# Patient Record
Sex: FEMALE | Race: WHITE | NOT HISPANIC OR LATINO | Employment: OTHER | URBAN - METROPOLITAN AREA
[De-identification: names, ages, dates, MRNs, and addresses within clinical notes are randomized per-mention and may not be internally consistent; named-entity substitution may affect disease eponyms.]

---

## 2021-02-18 ENCOUNTER — TELEPHONE (OUTPATIENT)
Dept: FAMILY MEDICINE | Facility: CLINIC | Age: 81
End: 2021-02-18

## 2021-02-18 RX ORDER — IRON,CARB/VIT C/VIT B12/FOLIC 100-250-1
1 TABLET ORAL DAILY
COMMUNITY
Start: 2020-11-18 | End: 2021-05-05

## 2021-02-22 RX ORDER — GLIPIZIDE 2.5 MG/1
2.5 TABLET, EXTENDED RELEASE ORAL DAILY
COMMUNITY
Start: 2021-02-20 | End: 2021-02-22 | Stop reason: SDUPTHER

## 2021-02-22 RX ORDER — GLIPIZIDE 2.5 MG/1
2.5 TABLET, EXTENDED RELEASE ORAL DAILY
Qty: 30 TABLET | Refills: 5 | Status: SHIPPED | OUTPATIENT
Start: 2021-02-22 | End: 2023-01-12 | Stop reason: SDUPTHER

## 2021-03-01 ENCOUNTER — OFFICE VISIT (OUTPATIENT)
Dept: FAMILY MEDICINE | Facility: CLINIC | Age: 81
End: 2021-03-01
Payer: MEDICARE

## 2021-03-01 VITALS
HEIGHT: 62 IN | TEMPERATURE: 97 F | BODY MASS INDEX: 21.53 KG/M2 | HEART RATE: 55 BPM | DIASTOLIC BLOOD PRESSURE: 82 MMHG | OXYGEN SATURATION: 98 % | SYSTOLIC BLOOD PRESSURE: 110 MMHG | WEIGHT: 117 LBS

## 2021-03-01 DIAGNOSIS — Z12.31 ENCOUNTER FOR SCREENING MAMMOGRAM FOR MALIGNANT NEOPLASM OF BREAST: ICD-10-CM

## 2021-03-01 DIAGNOSIS — M54.2 CERVICALGIA: Primary | ICD-10-CM

## 2021-03-01 DIAGNOSIS — Z12.39 ENCOUNTER FOR SCREENING FOR MALIGNANT NEOPLASM OF BREAST, UNSPECIFIED SCREENING MODALITY: ICD-10-CM

## 2021-03-01 DIAGNOSIS — K21.9 GASTROESOPHAGEAL REFLUX DISEASE, UNSPECIFIED WHETHER ESOPHAGITIS PRESENT: ICD-10-CM

## 2021-03-01 DIAGNOSIS — R73.09 ELEVATED GLUCOSE: ICD-10-CM

## 2021-03-01 PROCEDURE — 99214 OFFICE O/P EST MOD 30 MIN: CPT | Mod: S$GLB,,, | Performed by: NURSE PRACTITIONER

## 2021-03-01 PROCEDURE — 99214 PR OFFICE/OUTPT VISIT, EST, LEVL IV, 30-39 MIN: ICD-10-PCS | Mod: S$GLB,,, | Performed by: NURSE PRACTITIONER

## 2021-03-01 RX ORDER — FAMOTIDINE 40 MG/1
40 TABLET, FILM COATED ORAL DAILY
COMMUNITY
End: 2021-08-03 | Stop reason: SDUPTHER

## 2021-03-01 RX ORDER — OMEPRAZOLE 20 MG/1
20 CAPSULE, DELAYED RELEASE ORAL DAILY
Qty: 30 CAPSULE | Refills: 11 | Status: SHIPPED | OUTPATIENT
Start: 2021-03-01 | End: 2021-05-05

## 2021-05-03 ENCOUNTER — OFFICE VISIT (OUTPATIENT)
Dept: FAMILY MEDICINE | Facility: CLINIC | Age: 81
End: 2021-05-03
Payer: MEDICARE

## 2021-05-03 VITALS
BODY MASS INDEX: 23.19 KG/M2 | HEIGHT: 62 IN | DIASTOLIC BLOOD PRESSURE: 76 MMHG | OXYGEN SATURATION: 97 % | TEMPERATURE: 97 F | HEART RATE: 60 BPM | SYSTOLIC BLOOD PRESSURE: 142 MMHG | WEIGHT: 126 LBS

## 2021-05-03 DIAGNOSIS — E11.9 DIABETES MELLITUS WITHOUT COMPLICATION: Primary | ICD-10-CM

## 2021-05-03 DIAGNOSIS — H92.03 EAR PAIN, BILATERAL: ICD-10-CM

## 2021-05-03 DIAGNOSIS — I25.10 CORONARY ARTERY DISEASE, ANGINA PRESENCE UNSPECIFIED, UNSPECIFIED VESSEL OR LESION TYPE, UNSPECIFIED WHETHER NATIVE OR TRANSPLANTED HEART: ICD-10-CM

## 2021-05-03 PROCEDURE — 99214 PR OFFICE/OUTPT VISIT, EST, LEVL IV, 30-39 MIN: ICD-10-PCS | Mod: S$GLB,,, | Performed by: NURSE PRACTITIONER

## 2021-05-03 PROCEDURE — 99214 OFFICE O/P EST MOD 30 MIN: CPT | Mod: S$GLB,,, | Performed by: NURSE PRACTITIONER

## 2021-05-03 RX ORDER — LATANOPROST 50 UG/ML
SOLUTION/ DROPS OPHTHALMIC
COMMUNITY
Start: 2021-04-05

## 2021-05-03 RX ORDER — PRAVASTATIN SODIUM 40 MG/1
TABLET ORAL
COMMUNITY
Start: 2021-03-02 | End: 2021-05-05

## 2021-05-05 RX ORDER — LANOLIN ALCOHOL/MO/W.PET/CERES
100 CREAM (GRAM) TOPICAL DAILY
COMMUNITY

## 2021-05-05 RX ORDER — FERROUS SULFATE 324(65)MG
324 TABLET, DELAYED RELEASE (ENTERIC COATED) ORAL
COMMUNITY

## 2021-05-05 RX ORDER — ASPIRIN 81 MG/1
81 TABLET ORAL DAILY
COMMUNITY

## 2021-05-05 RX ORDER — MINOXIDIL 2 %
1 SOLUTION, NON-ORAL TOPICAL 2 TIMES DAILY
COMMUNITY

## 2021-05-05 RX ORDER — PANTOPRAZOLE SODIUM 40 MG/1
40 TABLET, DELAYED RELEASE ORAL DAILY
COMMUNITY
End: 2021-08-03

## 2021-05-05 RX ORDER — UBIQUINOL 100 MG
1 CAPSULE ORAL DAILY
COMMUNITY

## 2021-05-05 RX ORDER — PRAVASTATIN SODIUM 20 MG/1
20 TABLET ORAL DAILY
COMMUNITY
End: 2023-09-12

## 2021-05-05 RX ORDER — CHOLECALCIFEROL (VITAMIN D3) 125 MCG
1 CAPSULE ORAL DAILY
COMMUNITY

## 2021-05-05 RX ORDER — MULTIVITAMIN
1 TABLET ORAL ONCE
COMMUNITY

## 2021-08-03 ENCOUNTER — OFFICE VISIT (OUTPATIENT)
Dept: FAMILY MEDICINE | Facility: CLINIC | Age: 81
End: 2021-08-03
Payer: MEDICARE

## 2021-08-03 VITALS
DIASTOLIC BLOOD PRESSURE: 68 MMHG | SYSTOLIC BLOOD PRESSURE: 140 MMHG | WEIGHT: 115 LBS | HEIGHT: 62 IN | BODY MASS INDEX: 21.16 KG/M2 | RESPIRATION RATE: 18 BRPM | HEART RATE: 81 BPM | TEMPERATURE: 98 F | OXYGEN SATURATION: 98 %

## 2021-08-03 DIAGNOSIS — E11.9 DIABETES MELLITUS WITHOUT COMPLICATION: Primary | ICD-10-CM

## 2021-08-03 DIAGNOSIS — K21.9 GASTROESOPHAGEAL REFLUX DISEASE, UNSPECIFIED WHETHER ESOPHAGITIS PRESENT: ICD-10-CM

## 2021-08-03 DIAGNOSIS — G56.02 CARPAL TUNNEL SYNDROME OF LEFT WRIST: ICD-10-CM

## 2021-08-03 PROCEDURE — 99213 PR OFFICE/OUTPT VISIT, EST, LEVL III, 20-29 MIN: ICD-10-PCS | Mod: S$GLB,,, | Performed by: NURSE PRACTITIONER

## 2021-08-03 PROCEDURE — 99213 OFFICE O/P EST LOW 20 MIN: CPT | Mod: S$GLB,,, | Performed by: NURSE PRACTITIONER

## 2021-08-03 RX ORDER — FAMOTIDINE 40 MG/1
40 TABLET, FILM COATED ORAL DAILY
Qty: 90 TABLET | Refills: 1 | Status: SHIPPED | OUTPATIENT
Start: 2021-08-03 | End: 2023-10-10

## 2021-08-03 RX ORDER — OMEPRAZOLE 40 MG/1
40 CAPSULE, DELAYED RELEASE ORAL DAILY PRN
COMMUNITY
End: 2023-10-10

## 2021-08-03 RX ORDER — NITROGLYCERIN 0.4 MG/1
TABLET SUBLINGUAL
COMMUNITY
Start: 2021-06-02

## 2021-11-08 ENCOUNTER — TELEPHONE (OUTPATIENT)
Dept: FAMILY MEDICINE | Facility: CLINIC | Age: 81
End: 2021-11-08
Payer: MEDICARE

## 2021-11-08 RX ORDER — SCOLOPAMINE TRANSDERMAL SYSTEM 1 MG/1
1 PATCH, EXTENDED RELEASE TRANSDERMAL
Qty: 5 PATCH | Refills: 0 | Status: SHIPPED | OUTPATIENT
Start: 2021-11-08 | End: 2022-08-09

## 2022-01-13 ENCOUNTER — OFFICE VISIT (OUTPATIENT)
Dept: FAMILY MEDICINE | Facility: CLINIC | Age: 82
End: 2022-01-13
Payer: MEDICARE

## 2022-01-13 VITALS
SYSTOLIC BLOOD PRESSURE: 138 MMHG | HEART RATE: 77 BPM | HEIGHT: 62 IN | TEMPERATURE: 99 F | RESPIRATION RATE: 18 BRPM | WEIGHT: 115 LBS | BODY MASS INDEX: 21.16 KG/M2 | DIASTOLIC BLOOD PRESSURE: 68 MMHG | OXYGEN SATURATION: 98 %

## 2022-01-13 DIAGNOSIS — M54.10 RADICULAR PAIN: ICD-10-CM

## 2022-01-13 DIAGNOSIS — R31.9 URINARY TRACT INFECTION WITH HEMATURIA, SITE UNSPECIFIED: ICD-10-CM

## 2022-01-13 DIAGNOSIS — K21.9 GASTROESOPHAGEAL REFLUX DISEASE, UNSPECIFIED WHETHER ESOPHAGITIS PRESENT: ICD-10-CM

## 2022-01-13 DIAGNOSIS — E11.9 DIABETES MELLITUS WITHOUT COMPLICATION: Primary | ICD-10-CM

## 2022-01-13 DIAGNOSIS — M54.2 CERVICALGIA: ICD-10-CM

## 2022-01-13 DIAGNOSIS — N39.0 URINARY TRACT INFECTION WITH HEMATURIA, SITE UNSPECIFIED: ICD-10-CM

## 2022-01-13 LAB
BILIRUB SERPL-MCNC: ABNORMAL MG/DL
BLOOD URINE, POC: ABNORMAL
CLARITY, POC UA: CLEAR
COLOR, POC UA: YELLOW
GLUCOSE UR QL STRIP: NEGATIVE
KETONES UR QL STRIP: NEGATIVE
LEUKOCYTE ESTERASE URINE, POC: ABNORMAL
NITRITE, POC UA: NEGATIVE
PH, POC UA: 5.5
PROTEIN, POC: NEGATIVE
SPECIFIC GRAVITY, POC UA: 1.02
UROBILINOGEN, POC UA: ABNORMAL

## 2022-01-13 PROCEDURE — 99213 PR OFFICE/OUTPT VISIT, EST, LEVL III, 20-29 MIN: ICD-10-PCS | Mod: S$GLB,,, | Performed by: FAMILY MEDICINE

## 2022-01-13 PROCEDURE — 81000 URINALYSIS NONAUTO W/SCOPE: CPT | Performed by: FAMILY MEDICINE

## 2022-01-13 PROCEDURE — 81002 POCT URINE DIPSTICK WITHOUT MICROSCOPE: ICD-10-PCS | Mod: S$GLB,,, | Performed by: FAMILY MEDICINE

## 2022-01-13 PROCEDURE — 99213 OFFICE O/P EST LOW 20 MIN: CPT | Mod: S$GLB,,, | Performed by: FAMILY MEDICINE

## 2022-01-13 PROCEDURE — 81002 URINALYSIS NONAUTO W/O SCOPE: CPT | Mod: S$GLB,,, | Performed by: FAMILY MEDICINE

## 2022-01-13 RX ORDER — PHENAZOPYRIDINE HYDROCHLORIDE 200 MG/1
200 TABLET, FILM COATED ORAL 3 TIMES DAILY PRN
Qty: 10 TABLET | Refills: 1 | Status: SHIPPED | OUTPATIENT
Start: 2022-01-13 | End: 2022-01-23

## 2022-01-13 RX ORDER — CEFADROXIL 500 MG/1
500 CAPSULE ORAL EVERY 12 HOURS
Qty: 10 CAPSULE | Refills: 1 | Status: SHIPPED | OUTPATIENT
Start: 2022-01-13 | End: 2022-08-09

## 2022-01-13 NOTE — PROGRESS NOTES
Subjective:       Patient ID: Kayla Valverde is a 81 y.o. female.    Chief Complaint: Urinary Tract Infection      Review of patient's allergies indicates:  No Known Allergies   HPI  Review of Systems   Constitutional: Negative for chills, fatigue, fever and unexpected weight change.   HENT: Negative for ear pain, rhinorrhea, sinus pressure/congestion, sneezing and sore throat.    Eyes: Negative for photophobia and pain.   Respiratory: Negative for chest tightness, shortness of breath and wheezing.    Cardiovascular: Negative for chest pain.   Gastrointestinal: Negative for abdominal distention, abdominal pain, constipation, diarrhea and nausea.   Endocrine: Negative for polydipsia, polyphagia and polyuria.   Genitourinary: Negative for dysuria, frequency, menstrual problem, pelvic pain and urgency.   Musculoskeletal: Negative for back pain and joint swelling.   Integumentary:  Negative for rash, wound, breast mass and breast discharge.   Allergic/Immunologic: Negative for immunocompromised state.   Neurological: Negative for dizziness, vertigo, weakness and headaches.   Psychiatric/Behavioral: Negative for agitation, dysphoric mood and sleep disturbance.   All other systems reviewed and are negative.  Breast: Negative for mass        Objective:      Vitals:    01/13/22 1536   BP: 138/68   Pulse: 77   Resp: 18   Temp: 98.6 °F (37 °C)     Physical Exam  Vitals and nursing note reviewed.   Constitutional:       Appearance: Normal appearance.   HENT:      Head: Normocephalic.      Right Ear: Tympanic membrane normal.      Left Ear: Tympanic membrane normal.      Nose: Nose normal.      Mouth/Throat:      Mouth: Mucous membranes are moist.   Eyes:      Pupils: Pupils are equal, round, and reactive to light.   Cardiovascular:      Rate and Rhythm: Normal rate and regular rhythm.   Pulmonary:      Effort: Pulmonary effort is normal.   Abdominal:      General: Abdomen is flat. Bowel sounds are normal.      Palpations:  Abdomen is soft.   Musculoskeletal:         General: Normal range of motion.   Skin:     General: Skin is warm and dry.   Neurological:      General: No focal deficit present.      Mental Status: She is alert.   Psychiatric:         Mood and Affect: Mood normal.         Behavior: Behavior normal.         Assessment:       1. Diabetes mellitus without complication    2. Gastroesophageal reflux disease, unspecified whether esophagitis present    3. Cervicalgia    4. Radicular pain    5. Urinary tract infection with hematuria, site unspecified        Plan:       Diabetes mellitus without complication    Gastroesophageal reflux disease, unspecified whether esophagitis present    Cervicalgia    Radicular pain    Urinary tract infection with hematuria, site unspecified  -     POCT URINE DIPSTICK WITHOUT MICROSCOPE  -     URINALYSIS    Other orders  -     cefadroxil (DURICEF) 500 MG Cap; Take 1 capsule (500 mg total) by mouth every 12 (twelve) hours.  Dispense: 10 capsule; Refill: 1  -     phenazopyridine (PYRIDIUM) 200 MG tablet; Take 1 tablet (200 mg total) by mouth 3 (three) times daily as needed for Pain.  Dispense: 10 tablet; Refill: 1           Follow up in about 6 months (around 7/13/2022).

## 2022-01-14 LAB
BACTERIA #/AREA URNS HPF: ABNORMAL /HPF
BILIRUB UR QL STRIP: NEGATIVE
CLARITY UR: CLEAR
COLOR UR: YELLOW
GLUCOSE UR QL STRIP: NEGATIVE
HGB UR QL STRIP: ABNORMAL
KETONES UR QL STRIP: NEGATIVE
LEUKOCYTE ESTERASE UR QL STRIP: ABNORMAL
MICROSCOPIC COMMENT: ABNORMAL
NITRITE UR QL STRIP: NEGATIVE
PH UR STRIP: 6 [PH] (ref 5–8)
PROT UR QL STRIP: NEGATIVE
RBC #/AREA URNS HPF: 20 /HPF (ref 0–4)
SP GR UR STRIP: 1.02 (ref 1–1.03)
URN SPEC COLLECT METH UR: ABNORMAL
UROBILINOGEN UR STRIP-ACNC: NEGATIVE EU/DL
WBC #/AREA URNS HPF: >100 /HPF (ref 0–5)

## 2022-01-20 ENCOUNTER — PATIENT OUTREACH (OUTPATIENT)
Dept: ADMINISTRATIVE | Facility: HOSPITAL | Age: 82
End: 2022-01-20
Payer: MEDICARE

## 2022-01-20 DIAGNOSIS — E11.9 CONTROLLED TYPE 2 DIABETES MELLITUS WITHOUT COMPLICATION, WITHOUT LONG-TERM CURRENT USE OF INSULIN: Primary | ICD-10-CM

## 2022-01-20 NOTE — PROGRESS NOTES
Population Health Outreach.      Linked  labs to 2/3/22 appointment with Dr. Galeano.   .

## 2022-01-24 ENCOUNTER — LAB VISIT (OUTPATIENT)
Dept: LAB | Facility: CLINIC | Age: 82
End: 2022-01-24
Payer: MEDICARE

## 2022-01-24 DIAGNOSIS — E11.9 CONTROLLED TYPE 2 DIABETES MELLITUS WITHOUT COMPLICATION, WITHOUT LONG-TERM CURRENT USE OF INSULIN: ICD-10-CM

## 2022-01-24 LAB
CHOLEST SERPL-MCNC: 178 MG/DL (ref 120–199)
CHOLEST/HDLC SERPL: 2.3 {RATIO} (ref 2–5)
ESTIMATED AVG GLUCOSE: 140 MG/DL (ref 68–131)
HBA1C MFR BLD: 6.5 % (ref 4–5.6)
HDLC SERPL-MCNC: 78 MG/DL (ref 40–75)
HDLC SERPL: 43.8 % (ref 20–50)
LDLC SERPL CALC-MCNC: 90.6 MG/DL (ref 63–159)
NONHDLC SERPL-MCNC: 100 MG/DL
TRIGL SERPL-MCNC: 47 MG/DL (ref 30–150)

## 2022-01-24 PROCEDURE — 80061 LIPID PANEL: CPT | Performed by: FAMILY MEDICINE

## 2022-01-24 PROCEDURE — 36415 COLL VENOUS BLD VENIPUNCTURE: CPT | Mod: ,,, | Performed by: FAMILY MEDICINE

## 2022-01-24 PROCEDURE — 83036 HEMOGLOBIN GLYCOSYLATED A1C: CPT | Performed by: FAMILY MEDICINE

## 2022-01-24 PROCEDURE — 36415 PR COLLECTION VENOUS BLOOD,VENIPUNCTURE: ICD-10-PCS | Mod: ,,, | Performed by: FAMILY MEDICINE

## 2022-02-09 ENCOUNTER — OFFICE VISIT (OUTPATIENT)
Dept: FAMILY MEDICINE | Facility: CLINIC | Age: 82
End: 2022-02-09
Payer: MEDICARE

## 2022-02-09 DIAGNOSIS — K59.00 CONSTIPATION, UNSPECIFIED CONSTIPATION TYPE: ICD-10-CM

## 2022-02-09 DIAGNOSIS — M54.2 CERVICALGIA: ICD-10-CM

## 2022-02-09 DIAGNOSIS — K21.9 GASTROESOPHAGEAL REFLUX DISEASE, UNSPECIFIED WHETHER ESOPHAGITIS PRESENT: ICD-10-CM

## 2022-02-09 DIAGNOSIS — E11.9 DIABETES MELLITUS WITHOUT COMPLICATION: Primary | ICD-10-CM

## 2022-02-09 PROCEDURE — 99213 OFFICE O/P EST LOW 20 MIN: CPT | Mod: S$GLB,,, | Performed by: FAMILY MEDICINE

## 2022-02-09 PROCEDURE — 99213 PR OFFICE/OUTPT VISIT, EST, LEVL III, 20-29 MIN: ICD-10-PCS | Mod: S$GLB,,, | Performed by: FAMILY MEDICINE

## 2022-02-09 NOTE — PROGRESS NOTES
Subjective:       Patient ID: Kayla Valverde is a 81 y.o. female.    Chief Complaint: Results (Labs and urine only ) and Pain (All over. Primarily in her left leg.)      Review of patient's allergies indicates:  No Known Allergies   No complaints    Review of Systems   Constitutional: Negative for chills, fatigue, fever and unexpected weight change.   HENT: Negative for ear pain, rhinorrhea, sinus pressure/congestion, sneezing and sore throat.    Eyes: Negative for photophobia and pain.   Respiratory: Negative for chest tightness, shortness of breath and wheezing.    Cardiovascular: Negative for chest pain.   Gastrointestinal: Negative for abdominal distention, abdominal pain, constipation, diarrhea and nausea.   Endocrine: Negative for polydipsia, polyphagia and polyuria.   Genitourinary: Negative for dysuria, frequency, menstrual problem, pelvic pain and urgency.   Musculoskeletal: Negative for back pain and joint swelling.   Integumentary:  Negative for rash, wound, breast mass and breast discharge.   Allergic/Immunologic: Negative for immunocompromised state.   Neurological: Negative for dizziness, vertigo, weakness and headaches.   Psychiatric/Behavioral: Negative for agitation, dysphoric mood and sleep disturbance.   All other systems reviewed and are negative.  Breast: Negative for mass        Objective:      There were no vitals filed for this visit.  Physical Exam  Vitals and nursing note reviewed.   Constitutional:       Appearance: Normal appearance.   HENT:      Head: Normocephalic.      Right Ear: Tympanic membrane normal.      Left Ear: Tympanic membrane normal.      Nose: Nose normal.      Mouth/Throat:      Mouth: Mucous membranes are moist.   Eyes:      Pupils: Pupils are equal, round, and reactive to light.   Cardiovascular:      Rate and Rhythm: Normal rate and regular rhythm.   Pulmonary:      Effort: Pulmonary effort is normal.   Abdominal:      General: Abdomen is flat. Bowel sounds are normal.       Palpations: Abdomen is soft.   Musculoskeletal:         General: Normal range of motion.   Skin:     General: Skin is warm and dry.   Neurological:      General: No focal deficit present.      Mental Status: She is alert.   Psychiatric:         Mood and Affect: Mood normal.         Behavior: Behavior normal.        HgbA1C 6.5   HDL 78  Assessment:       1. Diabetes mellitus without complication    2. Gastroesophageal reflux disease, unspecified whether esophagitis present    3. Cervicalgia    4. Constipation, unspecified constipation type        Plan:       Diabetes mellitus without complication    Gastroesophageal reflux disease, unspecified whether esophagitis present    Cervicalgia    Constipation, unspecified constipation type           Follow up in about 6 months (around 8/9/2022).   Miralax

## 2022-03-31 DIAGNOSIS — E11.9 DIABETES MELLITUS WITHOUT COMPLICATION: ICD-10-CM

## 2022-08-09 ENCOUNTER — OFFICE VISIT (OUTPATIENT)
Dept: FAMILY MEDICINE | Facility: CLINIC | Age: 82
End: 2022-08-09
Payer: MEDICARE

## 2022-08-09 VITALS
SYSTOLIC BLOOD PRESSURE: 130 MMHG | BODY MASS INDEX: 20.8 KG/M2 | HEART RATE: 67 BPM | RESPIRATION RATE: 18 BRPM | WEIGHT: 113 LBS | HEIGHT: 62 IN | OXYGEN SATURATION: 97 % | TEMPERATURE: 98 F | DIASTOLIC BLOOD PRESSURE: 68 MMHG

## 2022-08-09 DIAGNOSIS — K21.9 GASTROESOPHAGEAL REFLUX DISEASE, UNSPECIFIED WHETHER ESOPHAGITIS PRESENT: ICD-10-CM

## 2022-08-09 DIAGNOSIS — J32.9 SINUSITIS, UNSPECIFIED CHRONICITY, UNSPECIFIED LOCATION: ICD-10-CM

## 2022-08-09 DIAGNOSIS — H92.03 EAR PAIN, BILATERAL: ICD-10-CM

## 2022-08-09 DIAGNOSIS — E11.9 DIABETES MELLITUS WITHOUT COMPLICATION: Primary | ICD-10-CM

## 2022-08-09 PROCEDURE — 99214 PR OFFICE/OUTPT VISIT, EST, LEVL IV, 30-39 MIN: ICD-10-PCS | Mod: S$GLB,,, | Performed by: FAMILY MEDICINE

## 2022-08-09 PROCEDURE — 99214 OFFICE O/P EST MOD 30 MIN: CPT | Mod: S$GLB,,, | Performed by: FAMILY MEDICINE

## 2022-08-09 NOTE — PROGRESS NOTES
Subjective:       Patient ID: Kayla Valverde is a 81 y.o. female.    Chief Complaint: Follow-up (6 month)      Review of patient's allergies indicates:  No Known Allergies   Review sugars    Review of Systems   Constitutional: Negative for chills, fatigue, fever and unexpected weight change.   HENT: Negative for ear pain, rhinorrhea, sinus pressure/congestion, sneezing and sore throat.    Eyes: Negative for photophobia and pain.   Respiratory: Negative for chest tightness, shortness of breath and wheezing.    Cardiovascular: Negative for chest pain.   Gastrointestinal: Negative for abdominal distention, abdominal pain, constipation, diarrhea and nausea.   Endocrine: Negative for polydipsia, polyphagia and polyuria.   Genitourinary: Negative for dysuria, frequency, menstrual problem, pelvic pain and urgency.   Musculoskeletal: Negative for back pain and joint swelling.   Integumentary:  Negative for rash, wound, breast mass and breast discharge.   Allergic/Immunologic: Negative for immunocompromised state.   Neurological: Negative for dizziness, vertigo, weakness and headaches.   Psychiatric/Behavioral: Negative for agitation, dysphoric mood and sleep disturbance.   All other systems reviewed and are negative.  Breast: Negative for mass        Objective:      Vitals:    08/09/22 1037   BP: 130/68   Pulse: 67   Resp: 18   Temp: 98 °F (36.7 °C)     Physical Exam  Vitals and nursing note reviewed.   Constitutional:       Appearance: Normal appearance.   HENT:      Head: Normocephalic.      Right Ear: Tympanic membrane normal.      Left Ear: Tympanic membrane normal.      Nose: Nose normal.      Mouth/Throat:      Mouth: Mucous membranes are moist.   Eyes:      Pupils: Pupils are equal, round, and reactive to light.   Cardiovascular:      Rate and Rhythm: Normal rate and regular rhythm.   Pulmonary:      Effort: Pulmonary effort is normal.   Abdominal:      General: Abdomen is flat. Bowel sounds are normal.       Palpations: Abdomen is soft.   Musculoskeletal:         General: Normal range of motion.   Skin:     General: Skin is warm and dry.   Neurological:      General: No focal deficit present.      Mental Status: She is alert.   Psychiatric:         Mood and Affect: Mood normal.         Behavior: Behavior normal.         Assessment:       1. Diabetes mellitus without complication    2. Gastroesophageal reflux disease, unspecified whether esophagitis present    3. Ear pain, bilateral    4. Sinusitis, unspecified chronicity, unspecified location        Plan:       Diabetes mellitus without complication    Gastroesophageal reflux disease, unspecified whether esophagitis present    Ear pain, bilateral    Sinusitis, unspecified chronicity, unspecified location           No follow-ups on file.   Ok to stop statin and see if mouth dryness resolves

## 2022-08-31 DIAGNOSIS — Z78.0 MENOPAUSE: ICD-10-CM

## 2023-01-12 RX ORDER — GLIPIZIDE 2.5 MG/1
2.5 TABLET, EXTENDED RELEASE ORAL DAILY
Qty: 30 TABLET | Refills: 5 | Status: SHIPPED | OUTPATIENT
Start: 2023-01-12 | End: 2023-02-11

## 2023-01-12 NOTE — TELEPHONE ENCOUNTER
----- Message from Wen Woods sent at 2023 12:36 PM CST -----  Contact: PT  Type:  RX Refill Request    Who Called:   Kayla/ PT  Refill or New Rx:  New RX  RX Name and Strength: glipiZIDE (GLUCOTROL) 2.5 MG TR24  How is the patient currently taking it?  Take 1 tablet (2.5 mg total) by mouth once daily  Is this a 30 day or 90 day RX: 30  Preferred Pharmacy with phone number:   [id number] - [place], [place] - [address] [address] [address] [address] [facility]  [address] [address] [address]  Phone: [phone] Fax: [phone]      Best Call Back Number:  [phone]  Additional Information: Old RX on file

## 2023-01-19 ENCOUNTER — TELEPHONE (OUTPATIENT)
Dept: FAMILY MEDICINE | Facility: CLINIC | Age: 83
End: 2023-01-19

## 2023-01-19 RX ORDER — GLIPIZIDE 2.5 MG/1
2.5 TABLET, EXTENDED RELEASE ORAL
Qty: 90 TABLET | Refills: 1 | Status: SHIPPED | OUTPATIENT
Start: 2023-01-19 | End: 2023-10-10

## 2023-01-19 NOTE — TELEPHONE ENCOUNTER
Landon ,  Please review message below.  This patient is requesting can the Glipizide 2.5 mg be sent to [facility] instead of [facility]. Insurance company change which pharmacy she can fill her medications.  Thank you  Signed:  [place] Staff  Caller: PT (Yesterday, 12:33 PM)  Type:  Needs Medical Advice     Who Called:PT   Would the patient rather a call back or a response via [facility]? CALL   Best Call Back Number: [phone] (home)     Additional Information: PT REQUEST A CALL BACK TO DISCUSS RX REFILL  REQUEST STATUS

## 2023-01-19 NOTE — TELEPHONE ENCOUNTER
----- Message from Kasey Don sent at 1/18/2023 12:33 PM CST -----  Contact: PT  Type:  Needs Medical Advice    Who Called:PT   Would the patient rather a call back or a response via [facility]? CALL   Best Call Back Number: [phone] (home)     Additional Information: PT REQUEST A CALL BACK TO DISCUSS RX REFILL  REQUEST STATUS

## 2023-02-23 ENCOUNTER — LAB VISIT (OUTPATIENT)
Dept: LAB | Facility: CLINIC | Age: 83
End: 2023-02-23
Payer: MEDICARE

## 2023-02-23 ENCOUNTER — OFFICE VISIT (OUTPATIENT)
Dept: FAMILY MEDICINE | Facility: CLINIC | Age: 83
End: 2023-02-23
Payer: MEDICARE

## 2023-02-23 VITALS
BODY MASS INDEX: 21.16 KG/M2 | OXYGEN SATURATION: 97 % | DIASTOLIC BLOOD PRESSURE: 78 MMHG | RESPIRATION RATE: 18 BRPM | SYSTOLIC BLOOD PRESSURE: 130 MMHG | HEIGHT: 62 IN | WEIGHT: 115 LBS | TEMPERATURE: 97 F | HEART RATE: 82 BPM

## 2023-02-23 DIAGNOSIS — E78.5 HYPERLIPIDEMIA, UNSPECIFIED HYPERLIPIDEMIA TYPE: ICD-10-CM

## 2023-02-23 DIAGNOSIS — E11.9 DIABETES MELLITUS WITHOUT COMPLICATION: ICD-10-CM

## 2023-02-23 DIAGNOSIS — E11.9 DIABETES MELLITUS WITHOUT COMPLICATION: Primary | ICD-10-CM

## 2023-02-23 DIAGNOSIS — K21.9 GASTROESOPHAGEAL REFLUX DISEASE, UNSPECIFIED WHETHER ESOPHAGITIS PRESENT: ICD-10-CM

## 2023-02-23 LAB
ESTIMATED AVG GLUCOSE: 143 MG/DL (ref 68–131)
HBA1C MFR BLD: 6.6 % (ref 4–5.6)

## 2023-02-23 PROCEDURE — 83036 HEMOGLOBIN GLYCOSYLATED A1C: CPT | Performed by: FAMILY MEDICINE

## 2023-02-23 PROCEDURE — 36415 PR COLLECTION VENOUS BLOOD,VENIPUNCTURE: ICD-10-PCS | Mod: ,,, | Performed by: STUDENT IN AN ORGANIZED HEALTH CARE EDUCATION/TRAINING PROGRAM

## 2023-02-23 PROCEDURE — 36415 COLL VENOUS BLD VENIPUNCTURE: CPT | Mod: ,,, | Performed by: STUDENT IN AN ORGANIZED HEALTH CARE EDUCATION/TRAINING PROGRAM

## 2023-02-23 PROCEDURE — 80053 COMPREHEN METABOLIC PANEL: CPT | Performed by: FAMILY MEDICINE

## 2023-02-23 PROCEDURE — 99213 OFFICE O/P EST LOW 20 MIN: CPT | Mod: S$GLB,,, | Performed by: FAMILY MEDICINE

## 2023-02-23 PROCEDURE — 99213 PR OFFICE/OUTPT VISIT, EST, LEVL III, 20-29 MIN: ICD-10-PCS | Mod: S$GLB,,, | Performed by: FAMILY MEDICINE

## 2023-02-23 NOTE — PROGRESS NOTES
Subjective:       Patient ID: Kayla Valverde is a 82 y.o. female.    Chief Complaint: Follow-up      Review of patient's allergies indicates:  No Known Allergies   Check up    Follow-up  Pertinent negatives include no abdominal pain, chest pain, chills, fatigue, fever, headaches, joint swelling, nausea, rash, sore throat, vertigo or weakness.   Review of Systems   Constitutional:  Negative for chills, fatigue, fever and unexpected weight change.   HENT:  Negative for ear pain, rhinorrhea, sinus pressure/congestion, sneezing and sore throat.    Eyes:  Negative for photophobia and pain.   Respiratory:  Negative for chest tightness, shortness of breath and wheezing.    Cardiovascular:  Negative for chest pain.   Gastrointestinal:  Negative for abdominal distention, abdominal pain, constipation, diarrhea and nausea.   Endocrine: Negative for polydipsia, polyphagia and polyuria.   Genitourinary:  Negative for dysuria, frequency, menstrual problem, pelvic pain and urgency.   Musculoskeletal:  Negative for back pain and joint swelling.   Integumentary:  Negative for rash, wound, breast mass and breast discharge.   Allergic/Immunologic: Negative for immunocompromised state.   Neurological:  Negative for dizziness, vertigo, weakness and headaches.   Psychiatric/Behavioral:  Negative for agitation, dysphoric mood and sleep disturbance.    All other systems reviewed and are negative.  Breast: Negative for mass      Objective:      Vitals:    02/23/23 1350   BP: 130/78   Pulse: 82   Resp: 18   Temp: 97.1 °F (36.2 °C)     Physical Exam  Vitals and nursing note reviewed.   Constitutional:       Appearance: Normal appearance.   HENT:      Head: Normocephalic.      Right Ear: Tympanic membrane normal.      Left Ear: Tympanic membrane normal.      Nose: Nose normal.      Mouth/Throat:      Mouth: Mucous membranes are moist.   Eyes:      Pupils: Pupils are equal, round, and reactive to light.   Cardiovascular:      Rate and Rhythm:  Normal rate and regular rhythm.   Pulmonary:      Effort: Pulmonary effort is normal.   Abdominal:      General: Abdomen is flat. Bowel sounds are normal.      Palpations: Abdomen is soft.   Musculoskeletal:         General: Normal range of motion.   Skin:     General: Skin is warm and dry.   Neurological:      General: No focal deficit present.      Mental Status: She is alert.   Psychiatric:         Mood and Affect: Mood normal.         Behavior: Behavior normal.       Assessment:       1. Diabetes mellitus without complication    2. Gastroesophageal reflux disease, unspecified whether esophagitis present    3. Hyperlipidemia, unspecified hyperlipidemia type          Plan:       Diabetes mellitus without complication  -     Hemoglobin A1C; Future; Expected date: 02/23/2023    Gastroesophageal reflux disease, unspecified whether esophagitis present    Hyperlipidemia, unspecified hyperlipidemia type           Follow up in about 6 months (around 8/23/2023).

## 2023-02-24 LAB
ALBUMIN SERPL BCP-MCNC: 3.9 G/DL (ref 3.5–5.2)
ALP SERPL-CCNC: 84 U/L (ref 55–135)
ALT SERPL W/O P-5'-P-CCNC: 20 U/L (ref 10–44)
ANION GAP SERPL CALC-SCNC: 9 MMOL/L (ref 8–16)
AST SERPL-CCNC: 32 U/L (ref 10–40)
BILIRUB SERPL-MCNC: 0.8 MG/DL (ref 0.1–1)
BUN SERPL-MCNC: 19 MG/DL (ref 8–23)
CALCIUM SERPL-MCNC: 9.4 MG/DL (ref 8.7–10.5)
CHLORIDE SERPL-SCNC: 106 MMOL/L (ref 95–110)
CO2 SERPL-SCNC: 24 MMOL/L (ref 23–29)
CREAT SERPL-MCNC: 0.7 MG/DL (ref 0.5–1.4)
EST. GFR  (NO RACE VARIABLE): >60 ML/MIN/1.73 M^2
GLUCOSE SERPL-MCNC: 86 MG/DL (ref 70–110)
POTASSIUM SERPL-SCNC: 4 MMOL/L (ref 3.5–5.1)
PROT SERPL-MCNC: 6.7 G/DL (ref 6–8.4)
SODIUM SERPL-SCNC: 139 MMOL/L (ref 136–145)

## 2023-06-13 ENCOUNTER — TELEPHONE (OUTPATIENT)
Dept: FAMILY MEDICINE | Facility: CLINIC | Age: 83
End: 2023-06-13
Payer: MEDICARE

## 2023-06-13 RX ORDER — SCOLOPAMINE TRANSDERMAL SYSTEM 1 MG/1
1 PATCH, EXTENDED RELEASE TRANSDERMAL
Qty: 4 PATCH | Refills: 0 | Status: SHIPPED | OUTPATIENT
Start: 2023-06-13 | End: 2023-09-12

## 2023-06-13 NOTE — TELEPHONE ENCOUNTER
----- Message from Wen Woods sent at 6/13/2023  9:04 AM CDT -----  Contact: PT  Type:  RX Refill Request    Who Called:  Kayla/ PT  Refill or New Rx: New RX  RX Name and Strength:  Scopolamine 1 MG/ 3 day  Dis Ing  How is the patient currently taking it? (ex. 1XDay):Directed   Is this a 30 day or 90 day RX: 4 patches   Preferred Pharmacy with phone number:    [id number] - [place], [place] - [address] [address] AT [address] [address] & [facility]  [address] [address] [address]  Phone: [phone] Fax: [phone]    Best Call Back Number:  [phone]  Additional Information: PT last prescribed medication in 2021, medication not listed in chart

## 2023-09-12 ENCOUNTER — OFFICE VISIT (OUTPATIENT)
Dept: FAMILY MEDICINE | Facility: CLINIC | Age: 83
End: 2023-09-12
Payer: MEDICARE

## 2023-09-12 VITALS
RESPIRATION RATE: 18 BRPM | BODY MASS INDEX: 20.61 KG/M2 | WEIGHT: 112 LBS | HEART RATE: 66 BPM | HEIGHT: 62 IN | DIASTOLIC BLOOD PRESSURE: 72 MMHG | TEMPERATURE: 98 F | OXYGEN SATURATION: 99 % | SYSTOLIC BLOOD PRESSURE: 112 MMHG

## 2023-09-12 DIAGNOSIS — M54.2 CERVICALGIA: ICD-10-CM

## 2023-09-12 DIAGNOSIS — E11.9 DIABETES MELLITUS WITHOUT COMPLICATION: Primary | ICD-10-CM

## 2023-09-12 DIAGNOSIS — E78.5 HYPERLIPIDEMIA, UNSPECIFIED HYPERLIPIDEMIA TYPE: ICD-10-CM

## 2023-09-12 DIAGNOSIS — I38 VALVULAR HEART DISEASE: ICD-10-CM

## 2023-09-12 DIAGNOSIS — R01.1 HEART MURMUR: ICD-10-CM

## 2023-09-12 PROCEDURE — 99213 PR OFFICE/OUTPT VISIT, EST, LEVL III, 20-29 MIN: ICD-10-PCS | Mod: S$GLB,,, | Performed by: FAMILY MEDICINE

## 2023-09-12 PROCEDURE — 99213 OFFICE O/P EST LOW 20 MIN: CPT | Mod: S$GLB,,, | Performed by: FAMILY MEDICINE

## 2023-09-12 RX ORDER — EZETIMIBE 10 MG/1
10 TABLET ORAL DAILY
Qty: 30 TABLET | Refills: 3 | Status: SHIPPED | OUTPATIENT
Start: 2023-09-12 | End: 2024-09-11

## 2023-09-12 NOTE — PROGRESS NOTES
Subjective:       Patient ID: Kayla Valverde is a 83 y.o. female.    Chief Complaint: Follow-up      Review of patient's allergies indicates:  No Known Allergies   No complaints statins cause pain in muscles now with pains  Since new statin started    Follow-up  Pertinent negatives include no abdominal pain, chest pain, chills, fatigue, fever, headaches, joint swelling, nausea, rash, sore throat, vertigo or weakness.     Review of Systems   Constitutional:  Negative for chills, fatigue, fever and unexpected weight change.   HENT:  Negative for ear pain, rhinorrhea, sinus pressure/congestion, sneezing and sore throat.    Eyes:  Negative for photophobia and pain.   Respiratory:  Negative for chest tightness, shortness of breath and wheezing.    Cardiovascular:  Negative for chest pain.   Gastrointestinal:  Negative for abdominal distention, abdominal pain, constipation, diarrhea and nausea.   Endocrine: Negative for polydipsia, polyphagia and polyuria.   Genitourinary:  Negative for dysuria, frequency, menstrual problem, pelvic pain and urgency.   Musculoskeletal:  Negative for back pain and joint swelling.   Integumentary:  Negative for rash, wound, breast mass and breast discharge.   Allergic/Immunologic: Negative for immunocompromised state.   Neurological:  Negative for dizziness, vertigo, weakness and headaches.   Psychiatric/Behavioral:  Negative for agitation, dysphoric mood and sleep disturbance.    All other systems reviewed and are negative.  Breast: Negative for mass        Objective:      Vitals:    09/12/23 0918   BP: 112/72   Pulse: 66   Resp: 18   Temp: 97.8 °F (36.6 °C)     Physical Exam  Vitals and nursing note reviewed.   Constitutional:       Appearance: Normal appearance.   HENT:      Head: Normocephalic.      Right Ear: Tympanic membrane normal.      Left Ear: Tympanic membrane normal.      Nose: Nose normal.      Mouth/Throat:      Mouth: Mucous membranes are moist.   Eyes:      Pupils: Pupils  are equal, round, and reactive to light.   Cardiovascular:      Rate and Rhythm: Normal rate and regular rhythm.      Heart sounds: Murmur (crescendo murmur heard along LSB) heard.   Pulmonary:      Effort: Pulmonary effort is normal.   Abdominal:      General: Abdomen is flat. Bowel sounds are normal.      Palpations: Abdomen is soft.   Musculoskeletal:         General: Normal range of motion.   Skin:     General: Skin is warm and dry.   Neurological:      General: No focal deficit present.      Mental Status: She is alert.   Psychiatric:         Mood and Affect: Mood normal.         Behavior: Behavior normal.         Assessment:       1. Diabetes mellitus without complication    2. Hyperlipidemia, unspecified hyperlipidemia type    3. Cervicalgia    4. Heart murmur    5. Valvular heart disease        Plan:       Diabetes mellitus without complication    Hyperlipidemia, unspecified hyperlipidemia type    Cervicalgia    Heart murmur    Valvular heart disease         Stop statin  Follow up in about 4 weeks (around 10/10/2023).

## 2023-09-14 ENCOUNTER — TELEPHONE (OUTPATIENT)
Dept: FAMILY MEDICINE | Facility: CLINIC | Age: 83
End: 2023-09-14

## 2023-09-14 ENCOUNTER — TELEPHONE (OUTPATIENT)
Dept: FAMILY MEDICINE | Facility: CLINIC | Age: 83
End: 2023-09-14
Payer: MEDICARE

## 2023-09-14 NOTE — TELEPHONE ENCOUNTER
Landon Galeano,  Please review this message from patient in this message she contacted [facility] in regards of you obtaining access to their patient portal.  Call placed to pt due to msg left, spoke w/pt states she received a call from on yesterday from 's office states need to contact this [facility] Administrative support services for receiving portal access [facility].  Pleaser review and advise.  Signed:  Henrietta Brizuela LPN  ----- Message from Dolores Mann sent at 9/14/2023  2:36 PM CDT -----  Type:  Patient Returning Call    Who Called:  pt   Who Left Message for Patient:  henrietta   Does the patient know what this is regarding?:  yes   Best Call Back Number:  [phone]    Additional Information:  please advise

## 2023-09-14 NOTE — TELEPHONE ENCOUNTER
----- Message from Perla Buck sent at 9/14/2023 10:00 AM CDT -----  Name of Who is Calling: [facility]        What is the request in detail: Would like to speak in regards to setting up a portal to view pt records at [facility]        Can the clinic reply by [facility]: no        What Number to Call Back if not in [facility]: [phone]

## 2023-09-21 DIAGNOSIS — Z78.0 MENOPAUSE: ICD-10-CM

## 2023-09-26 ENCOUNTER — TELEPHONE (OUTPATIENT)
Dept: FAMILY MEDICINE | Facility: CLINIC | Age: 83
End: 2023-09-26

## 2023-09-26 NOTE — TELEPHONE ENCOUNTER
Hello Chavonti,  Please review message below.  Call placed to pt due msg left spoke/w pt states trying to see if Dr. Galeano was able to register for Provider Portal/[facility] in order to have access to patient's records from other providers. Informed pt I will send a msg to Dr. Galeano's nurse for status in registering for [facility] records access.  Signed:  Libia Brizuela LPN    ----- Message from Virgil Elias sent at 9/25/2023 11:11 AM CDT -----  Regarding: Return Call  Contact: patient  Type:  Patient Returning Call    Who Called:patient  Who Left Message for Patient:office staff  Does the patient know what this is regarding?:[facility] records  Would the patient rather a call back or a response via [facility]? Provider portal for   Best Call Back Number:[phone]  Additional Information:

## 2023-10-09 LAB
LEFT EYE DM RETINOPATHY: NEGATIVE
RIGHT EYE DM RETINOPATHY: NEGATIVE

## 2023-10-10 ENCOUNTER — OFFICE VISIT (OUTPATIENT)
Dept: FAMILY MEDICINE | Facility: CLINIC | Age: 83
End: 2023-10-10
Payer: MEDICARE

## 2023-10-10 VITALS
OXYGEN SATURATION: 98 % | RESPIRATION RATE: 18 BRPM | HEIGHT: 62 IN | TEMPERATURE: 98 F | SYSTOLIC BLOOD PRESSURE: 120 MMHG | BODY MASS INDEX: 20.43 KG/M2 | DIASTOLIC BLOOD PRESSURE: 70 MMHG | HEART RATE: 59 BPM | WEIGHT: 111 LBS

## 2023-10-10 DIAGNOSIS — E11.9 DIABETES MELLITUS WITHOUT COMPLICATION: Primary | ICD-10-CM

## 2023-10-10 DIAGNOSIS — I38 VALVULAR HEART DISEASE: ICD-10-CM

## 2023-10-10 DIAGNOSIS — J30.2 SEASONAL ALLERGIC RHINITIS, UNSPECIFIED TRIGGER: ICD-10-CM

## 2023-10-10 PROCEDURE — 99213 PR OFFICE/OUTPT VISIT, EST, LEVL III, 20-29 MIN: ICD-10-PCS | Mod: S$GLB,,, | Performed by: FAMILY MEDICINE

## 2023-10-10 PROCEDURE — 99213 OFFICE O/P EST LOW 20 MIN: CPT | Mod: S$GLB,,, | Performed by: FAMILY MEDICINE

## 2023-10-10 RX ORDER — MONTELUKAST SODIUM 10 MG/1
10 TABLET ORAL NIGHTLY
Qty: 90 TABLET | OUTPATIENT
Start: 2023-10-10

## 2023-10-10 RX ORDER — MONTELUKAST SODIUM 10 MG/1
10 TABLET ORAL NIGHTLY
Qty: 30 TABLET | Refills: 1 | Status: SHIPPED | OUTPATIENT
Start: 2023-10-10 | End: 2023-11-17

## 2023-10-10 RX ORDER — FAMOTIDINE 40 MG/1
40 TABLET, FILM COATED ORAL DAILY
Qty: 90 TABLET | Refills: 1 | Status: SHIPPED | OUTPATIENT
Start: 2023-10-10

## 2023-10-10 RX ORDER — FLUTICASONE PROPIONATE 50 MCG
2 SPRAY, SUSPENSION (ML) NASAL 2 TIMES DAILY
Qty: 18.2 ML | Refills: 1 | Status: SHIPPED | OUTPATIENT
Start: 2023-10-10

## 2023-10-10 NOTE — PROGRESS NOTES
Patient Education       Well Child Exam 7 to 8 Years   About this topic   Your child's well child exam is a visit with the doctor to check your child's health. The doctor measures your child's weight and height, and may measure your child's body mass index (BMI). The doctor plots these numbers on a growth curve. The growth curve gives a picture of your child's growth at each visit. The doctor may listen to your child's heart, lungs, and belly. Your doctor will do a full exam of your child from the head to the toes.  Your child may also need shots or blood tests during this visit.  General   Growth and Development   Your doctor will ask you how your child is developing. The doctor will focus on the skills that most children your child's age are expected to do. During this time of your child's life, here are some things you can expect.  · Movement ? Your child may:  ? Be able to write and draw well  ? Kick a ball while running  ? Be independent in bathing or showering  ? Enjoy team or organized sports  ? Have better hand-eye coordination  · Hearing, seeing, and talking ? Your child will likely:  ? Have a longer attention span  ? Be able to tell time  ? Enjoy reading  ? Understand concepts of counting, same and different, and time  ? Be able to talk almost at the level of an adult  · Feelings and behavior ? Your child will likely:  ? Want to do a very good job and be upset if making mistakes  ? Take direction well  ? Understand the difference between right and wrong  ? May have low self confidence  ? Need encouragement and positive feedback  ? Want to fit in with peers  · Feeding ? Your child needs:  ? 3 servings of lowfat or fat-free milk each day  ? 5 servings of fruits and vegetables each day  ? To start each day with a healthy breakfast  ? To be given a variety of healthy foods. Many children like to help cook and make food fun.  ? To limit fruit juice, soda, chips, candy, and foods high in fats  ? To eat meals  Subjective:       Patient ID: Kayla Valverde is a 83 y.o. female.    Chief Complaint: Follow-up      Review of patient's allergies indicates:  No Known Allergies   Runny nose for weeks  Sugar ranges 130    Follow-up  Associated symptoms include congestion. Pertinent negatives include no abdominal pain, chest pain, chills, fatigue, fever, headaches, joint swelling, nausea, rash, sore throat, vertigo or weakness.     Review of Systems   Constitutional:  Negative for chills, fatigue, fever and unexpected weight change.   HENT:  Positive for nasal congestion, postnasal drip, rhinorrhea and sinus pressure/congestion. Negative for ear pain, sneezing and sore throat.    Eyes:  Negative for photophobia and pain.   Respiratory:  Negative for chest tightness, shortness of breath and wheezing.    Cardiovascular:  Negative for chest pain.   Gastrointestinal:  Negative for abdominal distention, abdominal pain, constipation, diarrhea and nausea.   Endocrine: Negative for polydipsia, polyphagia and polyuria.   Genitourinary:  Negative for dysuria, frequency, menstrual problem, pelvic pain and urgency.   Musculoskeletal:  Negative for back pain and joint swelling.   Integumentary:  Negative for rash, wound, breast mass and breast discharge.   Allergic/Immunologic: Negative for immunocompromised state.   Neurological:  Negative for dizziness, vertigo, weakness and headaches.   Psychiatric/Behavioral:  Negative for agitation, dysphoric mood and sleep disturbance.    All other systems reviewed and are negative.  Breast: Negative for mass        Objective:      Vitals:    10/10/23 0904   BP: 120/70   Pulse: (!) 59   Resp: 18   Temp: 97.7 °F (36.5 °C)     Physical Exam  Vitals and nursing note reviewed.   Constitutional:       Appearance: Normal appearance.   HENT:      Head: Normocephalic.      Right Ear: Tympanic membrane normal.      Left Ear: Tympanic membrane normal.      Nose: Congestion and rhinorrhea present.      Mouth/Throat:       Mouth: Mucous membranes are moist.      Pharynx: Posterior oropharyngeal erythema (nasopharynx edema and lymphohyperplasia) present.      Comments: lymphphohyperplasia  Eyes:      Pupils: Pupils are equal, round, and reactive to light.   Cardiovascular:      Rate and Rhythm: Normal rate and regular rhythm.   Pulmonary:      Effort: Pulmonary effort is normal.   Abdominal:      General: Abdomen is flat. Bowel sounds are normal.      Palpations: Abdomen is soft.   Musculoskeletal:         General: Normal range of motion.   Skin:     General: Skin is warm and dry.   Neurological:      General: No focal deficit present.      Mental Status: She is alert.   Psychiatric:         Mood and Affect: Mood normal.         Behavior: Behavior normal.         Assessment:       1. Diabetes mellitus without complication    2. Valvular heart disease    3. Seasonal allergic rhinitis, unspecified trigger        Plan:       Diabetes mellitus without complication    Valvular heart disease    Seasonal allergic rhinitis, unspecified trigger    Other orders  -     SITagliptin phosphate (JANUVIA) 50 MG Tab; Take 1 tablet (50 mg total) by mouth once daily.  Dispense: 90 tablet; Refill: 1  -     fluticasone propionate (FLONASE) 50 mcg/actuation nasal spray; 2 sprays (100 mcg total) by Each Nostril route 2 (two) times daily.  Dispense: 18.2 mL; Refill: 1  -     montelukast (SINGULAIR) 10 mg tablet; Take 1 tablet (10 mg total) by mouth every evening.  Dispense: 30 tablet; Refill: 1  -     famotidine (PEPCID) 40 MG tablet; Take 1 tablet (40 mg total) by mouth once daily.  Dispense: 90 tablet; Refill: 1           Follow up in about 4 weeks (around 11/7/2023).      

## 2023-10-11 ENCOUNTER — TELEPHONE (OUTPATIENT)
Dept: FAMILY MEDICINE | Facility: CLINIC | Age: 83
End: 2023-10-11

## 2023-10-11 NOTE — TELEPHONE ENCOUNTER
Landon Rocha,  Please review this message from this patient of Dr. Galeano in his absence today.  Call placed to pt due to msg left, spoke w/pt states the Rx the equivolent to the Januvia is more expensive. Requesting a different med. Has not met medication deductible. Hers is $500.  Call placed to due to msg left, spoke w/pt states Januvia 50 mg tabs will cost 500.00 dollars for 90 day supply. Requesting a Rx similar but cheaper. Instructed pt to contact insurance to see which med is equivalent to the Januvia and call us back.  Last office visit: 10/10/2023  Please review and advise.  Signed:  Libia Brizuela LPN  ----- Message from Milka Vaughan sent at 10/11/2023 11:47 AM CDT -----  Regarding: Needs Medical Advice/RX  Contact: patient at [phone]  Type: Needs Medical Advice  Who Called:  patient at [phone]    Pharmacy name and phone #:    [id number] - [place] [address] AT [place] [address] [address]  [address] [address] [address]  Phone: [phone] Fax: [phone]    Additional Information:  calling to discuss an alternative medication the the Januvia. The cost is $500. Patient's insurance looked at acarbose 15 mg, but that is even higher in cost. Please call and advise. Thank you    SITagliptin phosphate (JANUVIA) 50 MG Tab 90 tablet 1 10/10/2023 -   Sig - Route: Take 1 tablet (50 mg total) by mouth once daily. - Oral   Sent to pharmacy as: SITagliptin phosphate (JANUVIA) 50 MG Tab   E-Prescribing Status: Receipt confirmed by pharmacy (10/10/2023  9:25 AM CDT)

## 2023-10-11 NOTE — TELEPHONE ENCOUNTER
Call placed to due to msg left, spoke w/pt states Januvia 50 mg tabs will cost 500.00 dollars for 90 day supply. Requesting a Rx similar but cheaper. Instructed pt to contact insurance to see which med is equivalent to the Januvia and call us back with that information so I can share with Dr. Galeano or Ms. Perla.      ----- Message from [employer] sent at 10/10/2023  4:46 PM CDT -----  Regarding: Refill  Needs Medical Advice      Who Called: Pt      Pharmacy name and phone #:    [id number] - [place] [place] [facility] [address] [facility] [address] [facility] [address]  [address] [address] [place] [address]  Phone: [phone] Fax: [phone]        Would the patient rather a call back or a response via [facility]?  Call Back    Best Call Back Number: [phone]      Additional Information: [facility] pharmacy said the new prescriptions was going to be $500 for a 90 day supply. Insurance said its in tier 6. Would like to see about changing to something else similar.  Please Advise - Thank you

## 2023-10-12 ENCOUNTER — PATIENT MESSAGE (OUTPATIENT)
Dept: FAMILY MEDICINE | Facility: CLINIC | Age: 83
End: 2023-10-12
Payer: MEDICARE

## 2023-10-16 ENCOUNTER — TELEPHONE (OUTPATIENT)
Dept: FAMILY MEDICINE | Facility: CLINIC | Age: 83
End: 2023-10-16

## 2023-10-16 NOTE — TELEPHONE ENCOUNTER
Landon Galeano,  Please review message below from pharmacist.  [facility] due msg left, spoke w/Abby the pharmacist no there is no other medication in that class for her insurance due to all med are going to be pricey. And all amounts will be put towards patient's drug deductible.  Thank you.  Signed:  Libia Brizuela LPN      ----- Message from Virgil Elias sent at 10/13/2023 10:39 AM CDT -----  Regarding: Pharmacy  Contact: [facility]  Type:  Pharmacy Calling to Clarify an RX    Name of Caller:  [facility] #[id number] - [place] - [address] [place] [address] AT [place] [address] [address]  [address] [address] [address]  Phone: [phone] Fax: [phone]  Pharmacy Name:  Prescription Name:SITagliptin phosphate (JANUVIA) 50 MG Tab  What do they need to clarify?:patient has not met deductible/ medication cost 500.00$/ Can office staff advise   Best Call Back Number:  Additional Information:

## 2023-10-31 ENCOUNTER — PATIENT OUTREACH (OUTPATIENT)
Dept: ADMINISTRATIVE | Facility: HOSPITAL | Age: 83
End: 2023-10-31
Payer: MEDICARE

## 2023-10-31 NOTE — PROGRESS NOTES

## 2023-11-10 ENCOUNTER — OFFICE VISIT (OUTPATIENT)
Dept: FAMILY MEDICINE | Facility: CLINIC | Age: 83
End: 2023-11-10
Payer: MEDICARE

## 2023-11-10 ENCOUNTER — TELEPHONE (OUTPATIENT)
Dept: FAMILY MEDICINE | Facility: CLINIC | Age: 83
End: 2023-11-10

## 2023-11-10 VITALS
WEIGHT: 110 LBS | HEART RATE: 66 BPM | OXYGEN SATURATION: 99 % | BODY MASS INDEX: 20.24 KG/M2 | SYSTOLIC BLOOD PRESSURE: 122 MMHG | RESPIRATION RATE: 18 BRPM | HEIGHT: 62 IN | DIASTOLIC BLOOD PRESSURE: 72 MMHG | TEMPERATURE: 98 F

## 2023-11-10 DIAGNOSIS — E11.9 DIABETES MELLITUS WITHOUT COMPLICATION: Primary | ICD-10-CM

## 2023-11-10 DIAGNOSIS — E78.5 HYPERLIPIDEMIA, UNSPECIFIED HYPERLIPIDEMIA TYPE: ICD-10-CM

## 2023-11-10 DIAGNOSIS — J30.2 SEASONAL ALLERGIC RHINITIS, UNSPECIFIED TRIGGER: ICD-10-CM

## 2023-11-10 PROCEDURE — 99213 PR OFFICE/OUTPT VISIT, EST, LEVL III, 20-29 MIN: ICD-10-PCS | Mod: S$GLB,,, | Performed by: FAMILY MEDICINE

## 2023-11-10 PROCEDURE — 99213 OFFICE O/P EST LOW 20 MIN: CPT | Mod: S$GLB,,, | Performed by: FAMILY MEDICINE

## 2023-11-10 RX ORDER — GLIPIZIDE 5 MG/1
5 TABLET ORAL
Qty: 90 TABLET | Refills: 1 | Status: SHIPPED | OUTPATIENT
Start: 2023-11-10 | End: 2023-11-10

## 2023-11-10 RX ORDER — GLIPIZIDE 2.5 MG/1
5 TABLET, EXTENDED RELEASE ORAL
Qty: 180 TABLET | Refills: 3
Start: 2023-11-10 | End: 2023-12-18 | Stop reason: SDUPTHER

## 2023-11-10 NOTE — PROGRESS NOTES
Subjective:       Patient ID: Kayla Valverde is a 83 y.o. female.    Chief Complaint: Follow-up      Review of patient's allergies indicates:  No Known Allergies   Saw ENT for sinuses januvia too expensive    Follow-up  Associated symptoms include congestion and coughing.     Review of Systems   HENT:  Positive for nasal congestion and postnasal drip.    Respiratory:  Positive for cough.    All other systems reviewed and are negative.        Objective:      Vitals:    11/10/23 0947   BP: 122/72   Pulse: 66   Resp: 18   Temp: 97.5 °F (36.4 °C)     Physical Exam  Vitals and nursing note reviewed.   Constitutional:       Appearance: Normal appearance.   HENT:      Head: Normocephalic.      Right Ear: Tympanic membrane normal.      Left Ear: Tympanic membrane normal.      Nose: Nose normal.      Mouth/Throat:      Mouth: Mucous membranes are moist.   Eyes:      Pupils: Pupils are equal, round, and reactive to light.   Cardiovascular:      Rate and Rhythm: Normal rate and regular rhythm.   Pulmonary:      Effort: Pulmonary effort is normal.   Abdominal:      General: Abdomen is flat. Bowel sounds are normal.      Palpations: Abdomen is soft.   Musculoskeletal:         General: Normal range of motion.   Skin:     General: Skin is warm and dry.   Neurological:      General: No focal deficit present.      Mental Status: She is alert.   Psychiatric:         Mood and Affect: Mood normal.         Behavior: Behavior normal.         Assessment:       1. Diabetes mellitus without complication    2. Seasonal allergic rhinitis, unspecified trigger    3. Hyperlipidemia, unspecified hyperlipidemia type        Plan:       Diabetes mellitus without complication    Seasonal allergic rhinitis, unspecified trigger    Hyperlipidemia, unspecified hyperlipidemia type           Follow up in about 3 months (around 2/10/2024).

## 2023-11-10 NOTE — TELEPHONE ENCOUNTER
----- Message from Luba Duenas sent at 11/10/2023  2:47 PM CST -----  Regarding: Med info  Contact: pt [phone]  Type: Needs Medical Advice  Who Called:  Pt     Best Call Back Number: [phone]    Additional Information: Pt advised at appt today to call back with her medication info. Pt stated she is taking glipizide er 2.5mg. Thank you!

## 2023-11-17 RX ORDER — MONTELUKAST SODIUM 10 MG/1
10 TABLET ORAL NIGHTLY
Qty: 30 TABLET | Refills: 1 | Status: SHIPPED | OUTPATIENT
Start: 2023-11-17

## 2023-11-17 NOTE — TELEPHONE ENCOUNTER
Last office visit: 11/10/2023  Rx Auth Request  Received: 2 days ago  Hector Velasquez In  JAZZY Fuller Staff  Caller: Unspecified (2 days ago,  5:06 PM)

## 2023-12-18 RX ORDER — GLIPIZIDE 2.5 MG/1
5 TABLET, EXTENDED RELEASE ORAL
Qty: 180 TABLET | Refills: 3
Start: 2023-12-18 | End: 2023-12-21 | Stop reason: SDUPTHER

## 2023-12-18 NOTE — TELEPHONE ENCOUNTER
Landon Galeano,   Please review message below from patient due to Glipizide Rx. Noted Rx from 11/10/2023 was never sent to [facility] and the pharmacy filled the discontinued Glipizide for 5 mg BID and you changed the Rx to Glipizide 2.5 mg BID.   Please review and advise.   Last office visit: 11/10/2023  Thank you .  Signed:  Libia Brizuela LPN  ----- Message from Kasey Don sent at 12/18/2023  2:24 PM CST -----  Contact: PT  Type:  Needs Medical Advice    Who Called: PT   Symptoms (please be specific):  PT HAS QUESTIONS RE:  RX -  glipiZIDE (GLUCOTROL) 5 MG TR24  How long has patient had these symptoms:  N/A  Pharmacy name and phone #:    [facility][id number] - [place], [place] - [address] [address] [address] [facility] [address]  [address] [address] [address]  Phone: [phone] Fax: [phone]  Would the patient rather a call back or a response via [facility]? CALL   Best Call Back Number: [phone] (home)   Additional Information: THANK YOU

## 2023-12-21 RX ORDER — GLIPIZIDE 2.5 MG/1
5 TABLET, EXTENDED RELEASE ORAL
Qty: 180 TABLET | Refills: 3
Start: 2023-12-21 | End: 2024-12-20

## 2023-12-21 NOTE — TELEPHONE ENCOUNTER
Last office visit: 11/10/2023  ----- Message from Ava Irvin sent at 12/21/2023  9:25 AM CST -----  Contact: pt  Type: Needs Medical Advice         Who Called: pt  Best Call Back Number: [phone]  Additional Information: Requesting a call back regarding  pt is needing to know where the rx for glipiZIDE (GLUCOTROL) 2.5 MG TR24 was sent. Pt said she called her pharm and they said they have not received it yet.     glipiZIDE (GLUCOTROL) 2.5 MG TR24     [facility] #[id number] - [place], [place] - [address] [place] [address] AT [place] [address] [address]  [address] [address] [address]  Phone: [phone] Fax: [phone]      Please Advise- Thank you